# Patient Record
Sex: MALE
[De-identification: names, ages, dates, MRNs, and addresses within clinical notes are randomized per-mention and may not be internally consistent; named-entity substitution may affect disease eponyms.]

---

## 2023-10-14 ENCOUNTER — HOSPITAL ENCOUNTER (EMERGENCY)
Dept: HOSPITAL 75 - ER | Age: 28
LOS: 1 days | Discharge: HOME | End: 2023-10-15
Payer: COMMERCIAL

## 2023-10-14 VITALS — WEIGHT: 149.91 LBS | BODY MASS INDEX: 24.98 KG/M2 | HEIGHT: 64.96 IN

## 2023-10-14 DIAGNOSIS — F10.129: Primary | ICD-10-CM

## 2023-10-15 VITALS — DIASTOLIC BLOOD PRESSURE: 71 MMHG | SYSTOLIC BLOOD PRESSURE: 116 MMHG

## 2023-10-15 NOTE — ED GENERAL
General


Chief Complaint:  Substance Abuse


Stated Complaint:  ETOH INTOXICATION


Source of Information:  EMS


Exam Limitations:  Intoxication





History of Present Illness


Date Seen by Provider:  Oct 15, 2023


Time Seen by Provider:  00:00


Initial Comments


Patient is a 29yo male [facility] 

- chief complaint vomiting and intoxication. Patient is quite sleepy - rouses 

briefly to sternal rub. VSS.  Has NS running pre-hospital.  He is able to say 

his name.  Is chilling with the cold NS running wide open.  Abdomen is soft, 

hypoactive BS. Heart is regular. Lungs clear.





Per EMS he vomited about 500ml.





He is not able (due to intoxication) to give ROS, HPI PMH


Timing/Duration:  1-3 Hours


Severity:  Severe


Associated Systoms:  Nausea/Vomiting (per EMS)





Allergies and Home Medications


Allergies


Coded Allergies:  


     No Allergy Information Available (Unverified , 10/14/23)


   PT INTOXICATED





Patient Home Medication List


Home Medication List Reviewed:  Yes





Review of Systems


Review of Systems


Constitutional:  see HPI


unobtainable due to intoxication





Physical Exam


Vital Signs





Vital Signs - First Documented








 10/14/23





 23:52


 


Temp 34.7


 


Pulse 94


 


Resp 12


 


B/P (MAP) 113/64 (80)


 


Pulse Ox 96


 


O2 Delivery Room Air





Capillary Refill :


Height, Weight, BMI


Height: '"


Weight: lbs. oz. kg;  BMI


Method:


General Appearance:  No Apparent Distress, WD/WN, Other (somnolent)


Eyes:  Bilateral Eye Normal Inspection, Bilateral Eye PERRL


Respiratory:  Lungs Clear, Normal Breath Sounds, No Accessory Muscle Use, No 

Respiratory Distress


Cardiovascular:  Regular Rate, Rhythm


Gastrointestinal:  Non Tender, Soft


Neurologic/Psychiatric:  Other (somnolent but rousable to sternal rub)


Skin:  Normal Color, Warm/Dry





Progress/Results/Core Measures


Suspected Sepsis


SIRS


Temperature: 


Pulse:  


Respiratory Rate: 


 


Blood Pressure  / 


Mean:





Results/Orders


My Orders





Orders - VETO HERRMANN MD


Ed Iv/Invasive Line Start (10/15/23 00:07)


Lactated Ringers 1,000 Ml (Lactated Ring (10/15/23 00:15)


Ondansetron Injection (Ondansetron  Inj (10/15/23 00:15)





Medications Given in ED





Current Medications








 Medications  Dose


 Ordered  Sig/Missael


 Route  Start Time


 Stop Time Status Last Admin


Dose Admin


 


 Ondansetron HCl  8 mg  ONCE  ONCE


 IVP  10/15/23 00:15


 10/15/23 00:16 DC 10/15/23 00:13


8 MG








Vital Signs/I&O











 10/14/23





 23:52


 


Temp 34.7


 


Pulse 94


 


Resp 12


 


B/P (MAP) 113/64 (80)


 


Pulse Ox 96


 


O2 Delivery Room Air





Capillary Refill :


Progress Note :  


   Time:  00:50


Progress Note


Notified by the patient's nurse that he is up and walking to the bathroom.





Patient seen and evaluated by me.  Evaluation today includes physical exam.  

Pertinent physical exam findings well-developed well-nourished male, 

somnolent/sleeping.  Minimally arousable with sternal rub.  Pupils equal.  

Mucous membranes moist.  Heart regular.  Lungs clear.  Abdomen nondistended.  No

obvious outward signs of trauma.





Differential diagnosis includes alcohol intoxication, toxic effect of other 

illicit substances





Patient is treated in the emergency department with 8 mg of IV Zofran.  He 

completed a liter of normal saline per EMS and an additional liter of lactated 

Ringer's was ordered.  He was allowed to rest while being monitored in the 

emergency room, no deterioration in his condition throughout his stay.  As 

stated above now up and ambulatory to the bathroom.  When reassessed the patient

is alert and oriented.  States he hurts "everywhere" but is appropriate, 

smiling, interactive.  I reiterated discharge instructions to his friend who is 

at the bedside.  Recommended increased water intake over the next 12 hours.  

Return precautions provided all questions are sought and answered.  Patient is 

improved at discharge





Departure


Impression





   Primary Impression:  


   Alcohol intoxication


   Qualified Codes:  F10.920 - Alcohol use, unspecified with intoxication, 

   uncomplicated


Disposition:  01 HOME, SELF-CARE


Condition:  Improved





Departure-Patient Inst.


Decision time for Depature:  01:06


Patient Instructions:  Alcohol Intoxication ED





Add. Discharge Instructions:  


Drink lots of water over the next 12 hours, electrolyte replacement, Pedialyte.





Over-the-counter ibuprofen 2 to 3 tablets every 6 hours with food as needed for 

headache, body aches.





Return to the emergency department for any new, concerning or emergent 

complaints.











VETO HERRMANN MD         Oct 15, 2023 00:00